# Patient Record
Sex: MALE | Race: BLACK OR AFRICAN AMERICAN | NOT HISPANIC OR LATINO
[De-identification: names, ages, dates, MRNs, and addresses within clinical notes are randomized per-mention and may not be internally consistent; named-entity substitution may affect disease eponyms.]

---

## 2023-01-21 ENCOUNTER — HEALTH MAINTENANCE LETTER (OUTPATIENT)
Age: 38
End: 2023-01-21

## 2024-02-18 ENCOUNTER — HEALTH MAINTENANCE LETTER (OUTPATIENT)
Age: 39
End: 2024-02-18

## 2024-05-08 ENCOUNTER — ANCILLARY PROCEDURE (OUTPATIENT)
Dept: GENERAL RADIOLOGY | Facility: CLINIC | Age: 39
End: 2024-05-08
Attending: NURSE PRACTITIONER

## 2024-05-08 ENCOUNTER — OFFICE VISIT (OUTPATIENT)
Dept: URGENT CARE | Facility: URGENT CARE | Age: 39
End: 2024-05-08

## 2024-05-08 VITALS
HEART RATE: 76 BPM | OXYGEN SATURATION: 98 % | TEMPERATURE: 97 F | WEIGHT: 185.4 LBS | SYSTOLIC BLOOD PRESSURE: 115 MMHG | RESPIRATION RATE: 16 BRPM | DIASTOLIC BLOOD PRESSURE: 76 MMHG

## 2024-05-08 DIAGNOSIS — M25.571 ACUTE RIGHT ANKLE PAIN: ICD-10-CM

## 2024-05-08 DIAGNOSIS — S93.401A SPRAIN OF RIGHT ANKLE, UNSPECIFIED LIGAMENT, INITIAL ENCOUNTER: Primary | ICD-10-CM

## 2024-05-08 PROCEDURE — 73610 X-RAY EXAM OF ANKLE: CPT | Mod: TC | Performed by: RADIOLOGY

## 2024-05-08 PROCEDURE — 99204 OFFICE O/P NEW MOD 45 MIN: CPT | Performed by: NURSE PRACTITIONER

## 2024-05-08 NOTE — PROGRESS NOTES
Chief Complaint   Patient presents with    Urgent Care    Ankle Pain     Pt reports injured ankle while playing soccer x 4 days - swelling and painful x last 2 night, difficulty ambulating - iced area first night and bandaged area and tried Tylenol          ICD-10-CM    1. Acute right ankle pain  M25.571 XR Ankle Right G/E 3 Views        Ice, ankle splint, ibuprofen, recheck in 2 weeks with orthopedics if symptoms have not improved.    Red flag warning signs and when to go to the emergency room discussed.  Reviewed potential adverse reactions to medications.      X-ray of right ankle shows no acute fractures or dislocations as read by this provider.    Subjective     KELLI RUBIO is an 38 year old male who presents to clinic today for right ankle pain after pain soccer 4 days ago.  He has been using ice and Tylenol but continues to have difficulty ambulating.  He denies any previous injury to this area.       Objective    /76 (BP Location: Left arm, Patient Position: Sitting, Cuff Size: Adult Large)   Pulse 76   Temp 97  F (36.1  C) (Tympanic)   Resp 16   Wt 84.1 kg (185 lb 6.4 oz)   SpO2 98%   Nurses notes and VS have been reviewed.    Physical Exam       GENERAL APPEARANCE: alert and mild distress     MS: extremities normal- no gross deformities noted; normal muscle tone, except for the right ankle which is swollen and tender over the lateral malleolus, limited range of motion due to pain.     SKIN: no suspicious lesions or rashes     NEURO: Normal strength and tone, mentation intact and speech normal      YUKO Aggarwal, CNP  [facility] Provider    The use of [email or web address] dictation services may have been used to construct the content in this note; any grammatical or spelling errors are non-intentional. Please contact the author of this note directly if you are in need of any clarification.

## 2025-03-09 ENCOUNTER — HEALTH MAINTENANCE LETTER (OUTPATIENT)
Age: 40
End: 2025-03-09